# Patient Record
Sex: MALE | Race: WHITE | NOT HISPANIC OR LATINO | URBAN - METROPOLITAN AREA
[De-identification: names, ages, dates, MRNs, and addresses within clinical notes are randomized per-mention and may not be internally consistent; named-entity substitution may affect disease eponyms.]

---

## 2023-03-29 ENCOUNTER — TELEPHONE (OUTPATIENT)
Dept: UROLOGY | Facility: MEDICAL CENTER | Age: 73
End: 2023-03-29

## 2023-03-29 DIAGNOSIS — Z12.5 ENCOUNTER FOR PROSTATE CANCER SCREENING: Primary | ICD-10-CM

## 2023-03-29 NOTE — TELEPHONE ENCOUNTER
Please Triage -   New Patient- [place]    What is the reason for the patients appointment? patient's wife Lavinia Cheng called stating patient is a current patient of DR D'amico and would like to set up his follow up appointment due in October  Patient is scheduled for 10/11/23 at 145 pm    Patient would like to have his psa order mailed to his home  He will have it done one week prior to appointment   Patient can be reached at [phone]       Imaging/Lab Results:      Do we accept the patient's insurance or is the patient Self-Pay? Provider & Plan: [facility]  Member ID#: Has the patient had any previous urologist(s)? DR D'amico 10/2022       Have patient records been requested?in care every where       Has the patient had any outside testing done?in care every where      Does the patient have a personal history of cancer?no      Patient can be reached at :

## 2023-10-09 RX ORDER — SERTRALINE HYDROCHLORIDE 100 MG/1
TABLET, FILM COATED ORAL
COMMUNITY
Start: 2023-10-06

## 2023-10-09 RX ORDER — TADALAFIL 5 MG/1
5 TABLET ORAL
COMMUNITY

## 2023-10-09 RX ORDER — LORAZEPAM 0.5 MG/1
TABLET ORAL
COMMUNITY
Start: 2023-07-31

## 2023-10-09 RX ORDER — GABAPENTIN 300 MG/1
CAPSULE ORAL
COMMUNITY

## 2023-10-09 RX ORDER — RILUZOLE 50 MG/1
1 TABLET, FILM COATED ORAL 2 TIMES DAILY
COMMUNITY
Start: 2023-01-04

## 2023-10-09 RX ORDER — LEVOTHYROXINE SODIUM 25 UG/1
75 CAPSULE ORAL DAILY
COMMUNITY

## 2023-10-09 RX ORDER — OMEPRAZOLE 20 MG/1
CAPSULE, DELAYED RELEASE ORAL DAILY
COMMUNITY

## 2023-10-09 RX ORDER — B-COMPLEX WITH VITAMIN C
100 TABLET ORAL DAILY
COMMUNITY

## 2023-10-09 RX ORDER — CYANOCOBALAMIN (VITAMIN B-12) 500 MCG
LOZENGE ORAL DAILY
COMMUNITY

## 2023-10-09 RX ORDER — EPINEPHRINE 0.3 MG/.3ML
INJECTION SUBCUTANEOUS
COMMUNITY

## 2023-10-11 ENCOUNTER — OFFICE VISIT (OUTPATIENT)
Dept: UROLOGY | Facility: CLINIC | Age: 73
End: 2023-10-11
Payer: COMMERCIAL

## 2023-10-11 VITALS
WEIGHT: 172.8 LBS | TEMPERATURE: 97.7 F | SYSTOLIC BLOOD PRESSURE: 130 MMHG | DIASTOLIC BLOOD PRESSURE: 72 MMHG | HEART RATE: 50 BPM | OXYGEN SATURATION: 96 %

## 2023-10-11 DIAGNOSIS — N40.1 BENIGN PROSTATIC HYPERPLASIA WITH URINARY FREQUENCY: ICD-10-CM

## 2023-10-11 DIAGNOSIS — R35.0 BENIGN PROSTATIC HYPERPLASIA WITH URINARY FREQUENCY: ICD-10-CM

## 2023-10-11 DIAGNOSIS — R39.15 URGENCY OF URINATION: Primary | ICD-10-CM

## 2023-10-11 DIAGNOSIS — R97.20 ELEVATED PSA: ICD-10-CM

## 2023-10-11 PROCEDURE — 99213 OFFICE O/P EST LOW 20 MIN: CPT | Performed by: UROLOGY

## 2023-10-11 RX ORDER — KETOROLAC TROMETHAMINE 5 MG/ML
SOLUTION OPHTHALMIC
COMMUNITY
Start: 2023-10-09

## 2023-10-11 RX ORDER — TAMSULOSIN HYDROCHLORIDE 0.4 MG/1
CAPSULE ORAL
COMMUNITY

## 2023-10-11 RX ORDER — PREDNISOLONE ACETATE 10 MG/ML
SUSPENSION/ DROPS OPHTHALMIC
COMMUNITY
Start: 2023-10-09

## 2023-10-11 RX ORDER — CIPROFLOXACIN HYDROCHLORIDE 3.5 MG/ML
SOLUTION/ DROPS TOPICAL
COMMUNITY
Start: 2023-10-09

## 2023-10-11 RX ORDER — MIRABEGRON 25 MG/1
TABLET, FILM COATED, EXTENDED RELEASE ORAL
COMMUNITY
Start: 2023-03-01 | End: 2023-10-11 | Stop reason: ALTCHOICE

## 2023-10-11 NOTE — PROGRESS NOTES
UROLOGY PROGRESS NOTE         NAME: Doretha Nunes  AGE: 71 y.o. SEX: male  : 1950   MRN: [id number]    DATE: 10/11/2023  TIME: 2:20 PM    Assessment and Plan      Impression:   1. Urgency of urination    2. Elevated PSA    3. Benign prostatic hyperplasia with urinary frequency      Patient's main complaint at this point is urgency and he has some urge incontinence as well. He does have good flow. Myrbetriq is helped him we will increase the dose. We will get an MRI of the prostate with his elevated PSA. Its been creeping up over the past 5 years.  Plan: MRI of the prostate, increase Myrbetriq to 50 mg. Follow-up after      Chief Complaint     Chief Complaint   Patient presents with    Follow-up     Here for annual follow up. States he is having some urgency. No incontinence noted     History of Present Illness     HPI: Doretha Nunes is a 71y.o. year old male who presents with elevated PSA of 5.6. Its been mildly elevated and rising over the past 5 years. It was 4.4 in 2019. His main complaint is urge incontinence. He has nocturia 0-1 time per night. He voids frequently during the day. He leaks small amounts at least once a day. He does wear a pad but he also has fecal incontinence roughly every 10 days. Sudden and complete. No hematuria. Myrbetriq has helped him. We will try higher dose. The following portions of the patient's history were reviewed and updated as appropriate: allergies, current medications, past family history, past medical history, past social history, past surgical history and problem list.  Past Medical History:   Diagnosis Date    ALS (amyotrophic lateral sclerosis) ([address])     Anxiety     BPH (benign prostatic hyperplasia)     ED (erectile dysfunction)     Prolonged QT interval     Thyroid disease      Past Surgical History:   Procedure Laterality Date    TONSILLECTOMY       shoulder  Review of Systems     Const: Denies chills, fever and weight loss.   CV: Denies chest pain. Resp: Denies SOB. GI: Denies abdominal pain, nausea and vomiting. : Denies symptoms other than stated above. Musculo: Denies back pain. Objective   /72 (BP Location: Left arm, Patient Position: Sitting)   Pulse (!) 50   Temp 97.7 °F (36.5 °C)   Wt 78.4 kg (172 lb 12.8 oz)   SpO2 96%     Physical Exam  Const: Appears healthy and well developed. No signs of acute distress present. Resp: Respirations are regular and unlabored. CV: Rate is regular. Rhythm is regular. Abdomen: Abdomen is soft, nontender, and nondistended. Kidneys are not palpable. : Normal  exam.  Prostate 1+ benign. He has a small left inguinal hernia. Nontender  Psych: Patient's attitude is cooperative.  Mood is normal. Affect is normal.    Current Medications     Current Outpatient Medications:     Ascorbic Acid 500 MG CHEW, Chew 2 (two) times a day, Disp: , Rfl:     Cholecalciferol 50 MCG (2000 UT) CAPS, Take by mouth, Disp: , Rfl:     ciprofloxacin (CILOXAN) 0.3 % ophthalmic solution, Will be taking starting 10/17 after cataract surgery, Disp: , Rfl:     Coenzyme Q10 400 MG CAPS, Take by mouth, Disp: , Rfl:     cyanocobalamin (VITAMIN B-12) 1000 MCG tablet, Take by mouth daily, Disp: , Rfl:     EPINEPHrine (EPIPEN) 0.3 mg/0.3 mL SOAJ, as needed, Disp: , Rfl:     ketorolac (ACULAR) 0.5 % ophthalmic solution, Will be taking starting 10/17 after cataract surgery, Disp: , Rfl:     Levothyroxine Sodium 25 MCG CAPS, Take 75 mcg by mouth daily, Disp: , Rfl:     LORazepam (ATIVAN) 0.5 mg tablet, Take by mouth, Disp: , Rfl:     Magnesium 400 MG CAPS, Take 500 mg by mouth daily, Disp: , Rfl:     Multiple Vitamins-Minerals (ZINC PO), Take 100 mg by mouth, Disp: , Rfl:     omeprazole (PriLOSEC) 20 mg delayed release capsule, daily, Disp: , Rfl:     prednisoLONE acetate (PRED FORTE) 1 % ophthalmic suspension, Will be taking starting 10/17 after cataract surgery, Disp: , Rfl:     riluzole (RILUTEK) 50 MG tablet, Take 1 tablet by mouth 2 (two) times a day, Disp: , Rfl:     sertraline (ZOLOFT) 100 mg tablet, Take by mouth, Disp: , Rfl:     tadalafil (CIALIS) 5 MG tablet, Take 5 mg by mouth, Disp: , Rfl:     tamsulosin (FLOMAX) 0.4 mg, , Disp: , Rfl:     Vitamin E 400 units TABS, Take by mouth daily, Disp: , Rfl:     Zinc 100 MG TABS, Take 100 mg by mouth daily, Disp: , Rfl:     gabapentin (NEURONTIN) 300 mg capsule, , Disp: , Rfl:         Samaria Mendez MD

## 2023-11-07 ENCOUNTER — TELEPHONE (OUTPATIENT)
Age: 73
End: 2023-11-07

## 2023-11-07 NOTE — TELEPHONE ENCOUNTER
Patient's wife called to see if she could move his 11/22/23 appointment to another day. That day they're coming in to get the results of the MRI Bx. I saw some possible openings on 11/28/23. Would someone call them back if any of those openings are available and assist in rescheduling his appointment?     CB: [phone]

## 2023-11-10 ENCOUNTER — HOSPITAL ENCOUNTER (OUTPATIENT)
Dept: MRI IMAGING | Facility: HOSPITAL | Age: 73
End: 2023-11-10
Attending: UROLOGY
Payer: COMMERCIAL

## 2023-11-10 DIAGNOSIS — R35.0 BENIGN PROSTATIC HYPERPLASIA WITH URINARY FREQUENCY: ICD-10-CM

## 2023-11-10 DIAGNOSIS — R97.20 ELEVATED PSA: ICD-10-CM

## 2023-11-10 DIAGNOSIS — N40.1 BENIGN PROSTATIC HYPERPLASIA WITH URINARY FREQUENCY: ICD-10-CM

## 2023-11-10 DIAGNOSIS — R39.15 URGENCY OF URINATION: ICD-10-CM

## 2023-11-10 PROCEDURE — G1004 CDSM NDSC: HCPCS

## 2023-11-10 PROCEDURE — A9585 GADOBUTROL INJECTION: HCPCS | Performed by: UROLOGY

## 2023-11-10 PROCEDURE — 76377 3D RENDER W/INTRP POSTPROCES: CPT

## 2023-11-10 PROCEDURE — 72197 MRI PELVIS W/O & W/DYE: CPT

## 2023-11-10 RX ORDER — GADOBUTROL 604.72 MG/ML
8 INJECTION INTRAVENOUS
Status: COMPLETED | OUTPATIENT
Start: 2023-11-10 | End: 2023-11-10

## 2023-11-10 RX ADMIN — GADOBUTROL 8 ML: 604.72 INJECTION INTRAVENOUS at 14:14

## 2023-11-21 RX ORDER — CALCIUM CARBONATE/VITAMIN D3 500-10/5ML
LIQUID (ML) ORAL
COMMUNITY
Start: 2023-10-10

## 2023-11-21 RX ORDER — UREA 10 %
LOTION (ML) TOPICAL DAILY
COMMUNITY
Start: 2023-10-10

## 2023-11-21 RX ORDER — MOXIFLOXACIN 5 MG/ML
SOLUTION/ DROPS OPHTHALMIC
COMMUNITY
Start: 2023-10-23

## 2024-01-02 ENCOUNTER — PATIENT MESSAGE (OUTPATIENT)
Dept: UROLOGY | Facility: CLINIC | Age: 74
End: 2024-01-02

## 2024-01-02 DIAGNOSIS — N40.1 BENIGN PROSTATIC HYPERPLASIA WITH URINARY FREQUENCY: Primary | ICD-10-CM

## 2024-01-02 DIAGNOSIS — R35.0 BENIGN PROSTATIC HYPERPLASIA WITH URINARY FREQUENCY: Primary | ICD-10-CM

## 2024-01-02 RX ORDER — TAMSULOSIN HYDROCHLORIDE 0.4 MG/1
0.4 CAPSULE ORAL
Qty: 90 CAPSULE | Refills: 3 | Status: SHIPPED | OUTPATIENT
Start: 2024-01-02

## 2024-01-17 ENCOUNTER — TELEPHONE (OUTPATIENT)
Age: 74
End: 2024-01-17

## 2024-01-17 ENCOUNTER — OFFICE VISIT (OUTPATIENT)
Dept: UROLOGY | Facility: CLINIC | Age: 74
End: 2024-01-17
Payer: COMMERCIAL

## 2024-01-17 VITALS
OXYGEN SATURATION: 95 % | WEIGHT: 180 LBS | HEIGHT: 68 IN | DIASTOLIC BLOOD PRESSURE: 78 MMHG | TEMPERATURE: 98 F | HEART RATE: 65 BPM | SYSTOLIC BLOOD PRESSURE: 136 MMHG | BODY MASS INDEX: 27.28 KG/M2

## 2024-01-17 DIAGNOSIS — N40.1 BENIGN PROSTATIC HYPERPLASIA WITH URINARY FREQUENCY: ICD-10-CM

## 2024-01-17 DIAGNOSIS — R97.20 ELEVATED PSA: Primary | ICD-10-CM

## 2024-01-17 DIAGNOSIS — R35.0 BENIGN PROSTATIC HYPERPLASIA WITH URINARY FREQUENCY: ICD-10-CM

## 2024-01-17 PROCEDURE — 99213 OFFICE O/P EST LOW 20 MIN: CPT | Performed by: UROLOGY

## 2024-01-17 RX ORDER — PYRIDOXINE HCL (VITAMIN B6) 100 MG
200 TABLET ORAL DAILY
COMMUNITY
Start: 2023-12-15

## 2024-01-17 RX ORDER — LEVOTHYROXINE SODIUM 0.07 MG/1
75 TABLET ORAL
COMMUNITY
Start: 2023-12-26

## 2024-01-17 NOTE — PROGRESS NOTES
UROLOGY PROGRESS NOTE         NAME: Hi Valenzuela  AGE: 73 y.o. SEX: male  : 1950   MRN: [id number]    DATE: 2024  TIME: 2:04 PM    Assessment and Plan      Impression:   1. Elevated PSA    2. Benign prostatic hyperplasia with urinary frequency    Doing well from urologic standpoint.  His urinary flow is little slower than it was he will start taking one half of the Myrbetriq tablets for 25 mg.  Continue Flomax  Low risk for prostate cancer based on his MRI.  Previous biopsies negative.     Plan: Follow-up in the fall with a PSA.  Continue Flomax.  Myrbetriq 25 mg.  He will cut his 50 mg tablets in half.      Chief Complaint     Chief Complaint   Patient presents with    Follow-up     History of Present Illness     HPI: Hi Valenzuela is a 73 y.o. year old male who presents with history of BPH.  Patient with ALS.  History of BPH.  Patient with ALS.  Ambulatory dysfunction.  He does have significant frequency of urination occasional urge incontinence he can get to the bathroom fast enough.  His flow rate has been slower since doubling the Myrbetriq to 50 mg.  Voids every hour and 1/2 to 2 hours.  Nocturia is rare.  He occasionally has urge incontinence.  He does wear pull-ups.  Mainly for rectal incontinence.  PSA is mildly elevated.  MRI gives him a PI-RADS score of 2.  Low risk for having prostate cancer.  History of previous biopsies negative.              The following portions of the patient's history were reviewed and updated as appropriate: allergies, current medications, past family history, past medical history, past social history, past surgical history and problem list.  Past Medical History:   Diagnosis Date    ALS (amyotrophic lateral sclerosis) ([facility])     Anxiety     BPH (benign prostatic hyperplasia)     ED (erectile dysfunction)     Prolonged QT interval     Thyroid disease      Past Surgical History:   Procedure Laterality Date    TONSILLECTOMY       shoulder  Review of Systems  "    Const: Denies chills, fever and weight loss.  CV: Denies chest pain.  Resp: Denies SOB.  GI: Denies abdominal pain, nausea and vomiting.  : Denies symptoms other than stated above.  Musculo: Denies back pain.    Objective   /78   Pulse 65   Temp 98 °F (36.7 °C)   Ht 5' 8\" (1.727 m)   Wt 81.6 kg (180 lb)   SpO2 95%   BMI 27.37 kg/m²     Physical Exam  Const: Appears healthy and well developed. No signs of acute distress present.  Resp: Respirations are regular and unlabored.   CV: Rate is regular. Rhythm is regular.  Abdomen: Abdomen is soft, nontender, and nondistended. Kidneys are not palpable.  : No exam today.  Psych: Patient's attitude is cooperative. Mood is normal. Affect is normal.    Current Medications     Current Outpatient Medications:     calcium carbonate (OS-YUE) 1250 (500 Ca) MG chewable tablet, daily, Disp: , Rfl:     Coenzyme Q10 400 MG CAPS, Take by mouth, Disp: , Rfl:     cyanocobalamin (VITAMIN B-12) 1000 MCG tablet, Take by mouth daily, Disp: , Rfl:     EPINEPHrine (EPIPEN) 0.3 mg/0.3 mL SOAJ, as needed, Disp: , Rfl:     levothyroxine 75 mcg tablet, Take 75 mcg by mouth daily in the early morning, Disp: , Rfl:     Levothyroxine Sodium 25 MCG CAPS, Take 75 mcg by mouth daily, Disp: , Rfl:     LORazepam (ATIVAN) 0.5 mg tablet, Take by mouth, Disp: , Rfl:     Milk Thistle 200 MG CAPS, Take 200 mg by mouth in the morning, Disp: , Rfl:     Multiple Vitamins-Minerals (ZINC PO), Take 100 mg by mouth, Disp: , Rfl:     omeprazole (PriLOSEC) 20 mg delayed release capsule, daily, Disp: , Rfl:     riluzole (RILUTEK) 50 MG tablet, Take 1 tablet by mouth 2 (two) times a day, Disp: , Rfl:     sertraline (ZOLOFT) 100 mg tablet, Take by mouth, Disp: , Rfl:     tamsulosin (FLOMAX) 0.4 mg, Take 1 capsule (0.4 mg total) by mouth daily with dinner, Disp: 90 capsule, Rfl: 3    TURMERIC CURCUMIN PO, Take 250 mg by mouth in the morning, Disp: , Rfl:     Zinc 100 MG TABS, Take 100 mg by mouth daily, " Disp: , Rfl:     Magnesium Oxide 400 MG CAPS, DAILY IN THE MORNING, Disp: , Rfl:     tadalafil (CIALIS) 5 MG tablet, Take 5 mg by mouth (Patient not taking: Reported on 1/17/2024), Disp: , Rfl:         Frank D'Amico, MD

## 2024-01-17 NOTE — TELEPHONE ENCOUNTER
Patient and spouse called today to say that they are stuck in traffic and are around 15 miles away from the office.    Pt was informed of 15 min mohsen period.    Office was notified via Teams chat and given phone number to contact pt if needed.

## 2024-03-13 ENCOUNTER — NURSE TRIAGE (OUTPATIENT)
Age: 74
End: 2024-03-13

## 2024-11-13 ENCOUNTER — TELEPHONE (OUTPATIENT)
Dept: UROLOGY | Facility: CLINIC | Age: 74
End: 2024-11-13

## 2024-11-20 ENCOUNTER — OFFICE VISIT (OUTPATIENT)
Dept: UROLOGY | Facility: CLINIC | Age: 74
End: 2024-11-20
Payer: COMMERCIAL

## 2024-11-20 VITALS
DIASTOLIC BLOOD PRESSURE: 70 MMHG | OXYGEN SATURATION: 96 % | SYSTOLIC BLOOD PRESSURE: 110 MMHG | HEART RATE: 51 BPM | TEMPERATURE: 97.2 F

## 2024-11-20 DIAGNOSIS — R35.0 BENIGN PROSTATIC HYPERPLASIA WITH URINARY FREQUENCY: ICD-10-CM

## 2024-11-20 DIAGNOSIS — N18.31 CHRONIC KIDNEY DISEASE, STAGE 3A (HCC): ICD-10-CM

## 2024-11-20 DIAGNOSIS — R39.15 URGENCY OF URINATION: Primary | ICD-10-CM

## 2024-11-20 DIAGNOSIS — N40.1 BENIGN PROSTATIC HYPERPLASIA WITH URINARY FREQUENCY: ICD-10-CM

## 2024-11-20 LAB
SL AMB  POCT GLUCOSE, UA: NORMAL
SL AMB LEUKOCYTE ESTERASE,UA: NORMAL
SL AMB POCT BILIRUBIN,UA: NORMAL
SL AMB POCT BLOOD,UA: NORMAL
SL AMB POCT CLARITY,UA: CLEAR
SL AMB POCT COLOR,UA: YELLOW
SL AMB POCT KETONES,UA: NORMAL
SL AMB POCT NITRITE,UA: NORMAL
SL AMB POCT PH,UA: 6
SL AMB POCT SPECIFIC GRAVITY,UA: 1.01
SL AMB POCT URINE PROTEIN: NORMAL
SL AMB POCT UROBILINOGEN: 0.2

## 2024-11-20 PROCEDURE — 99213 OFFICE O/P EST LOW 20 MIN: CPT | Performed by: UROLOGY

## 2024-11-20 PROCEDURE — 81003 URINALYSIS AUTO W/O SCOPE: CPT | Performed by: UROLOGY

## 2024-11-20 RX ORDER — LEVOTHYROXINE SODIUM 100 UG/1
100 TABLET ORAL DAILY
COMMUNITY
Start: 2024-08-28

## 2024-11-20 RX ORDER — TAMSULOSIN HYDROCHLORIDE 0.4 MG/1
0.4 CAPSULE ORAL
Qty: 90 CAPSULE | Refills: 3 | Status: SHIPPED | OUTPATIENT
Start: 2024-11-20

## 2024-11-20 RX ORDER — ACETAMINOPHEN 325 MG/1
1 TABLET ORAL EVERY 6 HOURS PRN
COMMUNITY

## 2024-11-20 RX ORDER — GLYCOPYRROLATE 1 MG/1
1 TABLET ORAL 4 TIMES DAILY
COMMUNITY
Start: 2024-07-25

## 2024-11-20 RX ORDER — MAGNESIUM 200 MG
200 TABLET ORAL DAILY
COMMUNITY

## 2024-11-20 NOTE — PROGRESS NOTES
UROLOGY PROGRESS NOTE         NAME: Hi Valenzuela  AGE: 73 y.o. SEX: male  : 1950   MRN: [id number]    DATE: 2024  TIME: 2:01 PM    Assessment and Plan      Impression:   1. Urgency of urination  -     POCT urine dip auto non-scope  2. Benign prostatic hyperplasia with urinary frequency  Patient with a history of BPH.  He is currently on Flomax.  He is doing fairly well PSA is pending.  Drawn 2 days ago.  1 episode of hematospermia.     Plan: We will get a copy of his PSA.  Will get him set up for cystoscopy.  He will continue Flomax.      Chief Complaint     Chief Complaint   Patient presents with    Elevated PSA     History of Present Illness     HPI: Hi Valenzuela is a 73 y.o. year old male who presents with history of elevated PSA.  We have been following this along.  He had a negative MRI.  PSA was drawn 2 days ago however the result is pending yet.  He has no nocturia.  He does have urgency of urination in the morning and occasionally leaks.  He does have ALS and is slower to get to the bathroom.  He voids every 1-2 hours during the day.  Typically able to control things.  Occasionally has episodes of incontinence.  He does wear briefs.  We talked about considering condom catheters if he is interested.  He is sexually active.  Semen is typically yellow however there was 1 episode where he saw some blood in his semen has a discharge after sex.  No fever or chills.  Does have pain in his left groin.  He has a rash in his right groin.              The following portions of the patient's history were reviewed and updated as appropriate: allergies, current medications, past family history, past medical history, past social history, past surgical history and problem list.  Past Medical History:   Diagnosis Date    ALS (amyotrophic lateral sclerosis) (HCC)     Anxiety     BPH (benign prostatic hyperplasia)     ED (erectile dysfunction)     Prolonged QT interval     Thyroid disease      Past Surgical  History:   Procedure Laterality Date    PEG TUBE PLACEMENT      TONSILLECTOMY       shoulder  Review of Systems     Const: Denies chills, fever and weight loss.  CV: Denies chest pain.  Resp: Denies SOB.  GI: Denies abdominal pain, nausea and vomiting.  : Denies symptoms other than stated above.  Musculo: Denies back pain.    Objective   /70   Pulse (!) 51   Temp (!) 97.2 °F (36.2 °C)   SpO2 96%     Physical Exam  Const: Appears healthy and well developed. No signs of acute distress present.  Resp: Respirations are regular and unlabored.   CV: Rate is regular. Rhythm is regular.  Abdomen: Abdomen is soft, nontender, and nondistended. Kidneys are not palpable.  : Yeast infection right groin.  Left inguinal hernia.  Moderate nontender.  Prostate 1+ benign.  On exam otherwise.  Psych: Patient's attitude is cooperative. Mood is normal. Affect is normal.    Current Medications     Current Outpatient Medications:     acetaminophen (TYLENOL) 325 mg tablet, Take 1 tablet by mouth every 6 (six) hours as needed, Disp: , Rfl:     calcium carbonate (OS-YUE) 1250 (500 Ca) MG chewable tablet, daily, Disp: , Rfl:     Coenzyme Q10 400 MG CAPS, Take by mouth, Disp: , Rfl:     cyanocobalamin (VITAMIN B-12) 1000 MCG tablet, Take by mouth daily, Disp: , Rfl:     EPINEPHrine (EPIPEN) 0.3 mg/0.3 mL SOAJ, as needed, Disp: , Rfl:     esomeprazole (NexIUM) 20 mg capsule, Take 20 mg by mouth daily in the early morning, Disp: , Rfl:     glycopyrrolate (ROBINUL) 1 mg tablet, Take 1 mg by mouth 4 times a day, Disp: , Rfl:     levothyroxine 100 mcg tablet, Take 100 mcg by mouth daily, Disp: , Rfl:     LORazepam (ATIVAN) 0.5 mg tablet, Take by mouth, Disp: , Rfl:     Magnesium 200 MG TABS, Take 200 mg by mouth daily, Disp: , Rfl:     Multiple Vitamins-Minerals (ZINC PO), Take 100 mg by mouth, Disp: , Rfl:     riluzole (RILUTEK) 50 MG tablet, Take 1 tablet by mouth 2 (two) times a day, Disp: , Rfl:     sertraline (ZOLOFT) 100 mg  tablet, Take 50 mg by mouth daily, Disp: , Rfl:     tamsulosin (FLOMAX) 0.4 mg, Take 1 capsule (0.4 mg total) by mouth daily with dinner, Disp: 90 capsule, Rfl: 3    TURMERIC CURCUMIN PO, Take 250 mg by mouth in the morning, Disp: , Rfl:     vitamin E, tocopherol, 200 units capsule, Take 200 Units by mouth daily, Disp: , Rfl:     Zinc 100 MG TABS, Take 100 mg by mouth daily, Disp: , Rfl:         Frank D'Amico, MD

## 2024-11-21 ENCOUNTER — TELEPHONE (OUTPATIENT)
Dept: UROLOGY | Facility: CLINIC | Age: 74
End: 2024-11-21

## 2024-11-21 RX ORDER — MIRABEGRON 25 MG/1
25 TABLET, FILM COATED, EXTENDED RELEASE ORAL
COMMUNITY

## 2024-11-21 RX ORDER — OMEPRAZOLE 40 MG/1
1 CAPSULE, DELAYED RELEASE ORAL DAILY
COMMUNITY
Start: 2024-09-25

## 2024-11-21 NOTE — TELEPHONE ENCOUNTER
VM left for pt, he needs psa done. Asked him to go to pcp office, I am faxing the order now to [phone]. If he calls back, please relay that message.  PSA faxed, confirmation received.

## 2024-11-27 ENCOUNTER — PATIENT MESSAGE (OUTPATIENT)
Dept: UROLOGY | Facility: CLINIC | Age: 74
End: 2024-11-27

## 2025-01-16 ENCOUNTER — TELEPHONE (OUTPATIENT)
Dept: UROLOGY | Facility: CLINIC | Age: 75
End: 2025-01-16

## 2025-01-16 NOTE — TELEPHONE ENCOUNTER
Wife of the patient was returning a call to the office about rescheduling the appt for 2/4 because the provider will not be available.     They can not make it in tomorrow as offered.     Rescheduled to 2/27/2025

## 2025-01-16 NOTE — TELEPHONE ENCOUNTER
VM left for pt, offered appt tomorrow at 11am for cystoscope, will need to cancel 2/4/25 since Dr. D'Amico will not be here.

## 2025-02-27 ENCOUNTER — PROCEDURE VISIT (OUTPATIENT)
Dept: UROLOGY | Facility: CLINIC | Age: 75
End: 2025-02-27
Payer: COMMERCIAL

## 2025-02-27 VITALS
DIASTOLIC BLOOD PRESSURE: 74 MMHG | OXYGEN SATURATION: 93 % | WEIGHT: 173 LBS | SYSTOLIC BLOOD PRESSURE: 136 MMHG | BODY MASS INDEX: 26.22 KG/M2 | HEART RATE: 73 BPM | TEMPERATURE: 98 F | HEIGHT: 68 IN

## 2025-02-27 DIAGNOSIS — R35.0 BENIGN PROSTATIC HYPERPLASIA WITH URINARY FREQUENCY: ICD-10-CM

## 2025-02-27 DIAGNOSIS — R36.1 HEMOSPERMIA: ICD-10-CM

## 2025-02-27 DIAGNOSIS — N40.1 BENIGN PROSTATIC HYPERPLASIA WITH URINARY FREQUENCY: ICD-10-CM

## 2025-02-27 DIAGNOSIS — R39.15 URGENCY OF URINATION: Primary | ICD-10-CM

## 2025-02-27 PROCEDURE — 52000 CYSTOURETHROSCOPY: CPT | Performed by: UROLOGY

## 2025-02-27 RX ORDER — CIPROFLOXACIN 500 MG/1
500 TABLET, FILM COATED ORAL ONCE
Status: COMPLETED | OUTPATIENT
Start: 2025-02-27 | End: 2025-02-27

## 2025-02-27 RX ADMIN — CIPROFLOXACIN 500 MG: 500 TABLET, FILM COATED ORAL at 15:07

## 2025-02-27 NOTE — PROGRESS NOTES
"   Cystoscopy     Date/Time  2/27/2025 2:30 PM     Performed by  Frank D'Amico, MD   Authorized by  Frank D'Amico, MD     Universal Protocol:  procedure performed by consultantConsent: Verbal consent obtained. Written consent obtained.  Risks and benefits: risks, benefits and alternatives were discussed  Consent given by: patient  Time out: Immediately prior to procedure a \"time out\" was called to verify the correct patient, procedure, equipment, support staff and site/side marked as required.  Timeout called at: 2/27/2025 2:48 PM.  Patient identity confirmed: verbally with patient      Procedure Details:  Procedure type: cystoscopy    Patient tolerance: Patient tolerated the procedure well with no immediate complications    Additional Procedure Details: Patient underwent flexible digital cystoscopy.  Prepped with Betadine lidocaine jelly.  Patient was able to visualize the findings.  Supine position.  Study revealed normal urethra.  A mildly enlarged prostate gland that projects into the bladder slightly.  No urethral abnormalities.  Bladder was mildly trabeculated.  No tumors.  No diverticuli.  No stones.  No erythema.  The orifices were normal.  The scope was retroflexed.  He was wife was also present for the procedure.  1 dose of Cipro.  He will continue his Flomax and follow-up in 1 year or sooner if any troubles.  PSA was discussed.  It is down to 5.1.      "